# Patient Record
Sex: FEMALE | Race: WHITE | NOT HISPANIC OR LATINO | URBAN - NONMETROPOLITAN AREA
[De-identification: names, ages, dates, MRNs, and addresses within clinical notes are randomized per-mention and may not be internally consistent; named-entity substitution may affect disease eponyms.]

---

## 2016-09-07 PROBLEM — E11.9: Noted: 2021-03-08

## 2019-03-05 ENCOUNTER — IMPORTED ENCOUNTER (OUTPATIENT)
Dept: URBAN - NONMETROPOLITAN AREA CLINIC 1 | Facility: CLINIC | Age: 34
End: 2019-03-05

## 2019-03-05 PROCEDURE — S0621 ROUTINE OPHTHALMOLOGICAL EXA: HCPCS

## 2021-03-08 ENCOUNTER — IMPORTED ENCOUNTER (OUTPATIENT)
Dept: URBAN - NONMETROPOLITAN AREA CLINIC 1 | Facility: CLINIC | Age: 36
End: 2021-03-08

## 2021-03-08 PROBLEM — E11.9: Noted: 2021-03-08

## 2021-03-08 PROCEDURE — 92014 COMPRE OPH EXAM EST PT 1/>: CPT

## 2021-03-08 NOTE — PATIENT DISCUSSION
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 -Explained the possible effects of poorly controlled diabetes and the damage that diabetes can cause to ocular health. -Patient to check HgbA1C.-Pt instructed to contact our office with any vision changes.; Dr's Notes: PCP Sherri Storey

## 2022-04-16 ASSESSMENT — VISUAL ACUITY
OD_CC: J2
OD_SC: 20/50
OS_CC: J2
OD_SC: 20/40
OS_SC: 20/30
OS_SC: 20/40

## 2022-04-16 ASSESSMENT — TONOMETRY
OD_IOP_MMHG: 16
OS_IOP_MMHG: 16

## 2023-04-13 ENCOUNTER — COMPREHENSIVE EXAM (OUTPATIENT)
Dept: RURAL CLINIC 1 | Facility: CLINIC | Age: 38
End: 2023-04-13

## 2023-04-13 DIAGNOSIS — H52.13: ICD-10-CM

## 2023-04-13 DIAGNOSIS — E11.9: ICD-10-CM

## 2023-04-13 PROCEDURE — S0621 ROUTINE OPHTHALMOLOGICAL EXA: HCPCS

## 2023-04-13 ASSESSMENT — VISUAL ACUITY
OS_PH: 20/40
OU_CC: 20/25
OD_CC: 20/40
OS_CC: 20/50
OD_PH: 20/25-2
OU_CC: J1

## 2024-04-09 ENCOUNTER — ESTABLISHED PATIENT (OUTPATIENT)
Dept: RURAL CLINIC 1 | Facility: CLINIC | Age: 39
End: 2024-04-09

## 2024-04-09 DIAGNOSIS — H52.13: ICD-10-CM

## 2024-04-09 PROCEDURE — S0621AEC ROUTINE OPH EXAM INCLUDES REF/ EST PATIENT

## 2024-04-09 ASSESSMENT — VISUAL ACUITY
OD_CC: 20/60-2
OS_CC: 20/50-1
OU_CC: 20/40

## 2025-02-12 ENCOUNTER — COMPREHENSIVE EXAM (OUTPATIENT)
Age: 40
End: 2025-02-12

## 2025-02-12 DIAGNOSIS — H52.13: ICD-10-CM

## 2025-02-12 DIAGNOSIS — H43.812: ICD-10-CM

## 2025-02-12 DIAGNOSIS — H47.291: ICD-10-CM

## 2025-02-12 PROCEDURE — S0621AEC ROUTINE OPH EXAM INCLUDES REF/ EST PATIENT
